# Patient Record
Sex: FEMALE | Race: WHITE | NOT HISPANIC OR LATINO | URBAN - NONMETROPOLITAN AREA
[De-identification: names, ages, dates, MRNs, and addresses within clinical notes are randomized per-mention and may not be internally consistent; named-entity substitution may affect disease eponyms.]

---

## 2017-03-02 ENCOUNTER — APPOINTMENT (RX ONLY)
Dept: URBAN - NONMETROPOLITAN AREA CLINIC 22 | Facility: CLINIC | Age: 47
Setting detail: DERMATOLOGY
End: 2017-03-02

## 2017-03-02 VITALS — WEIGHT: 138 LBS | HEIGHT: 67.5 IN

## 2017-03-02 DIAGNOSIS — L70.0 ACNE VULGARIS: ICD-10-CM

## 2017-03-02 DIAGNOSIS — L71.0 PERIORAL DERMATITIS: ICD-10-CM

## 2017-03-02 PROCEDURE — ? PRESCRIPTION

## 2017-03-02 PROCEDURE — ? MEDICATION COUNSELING

## 2017-03-02 PROCEDURE — ? COUNSELING

## 2017-03-02 PROCEDURE — 99213 OFFICE O/P EST LOW 20 MIN: CPT

## 2017-03-02 PROCEDURE — ? TREATMENT REGIMEN

## 2017-03-02 PROCEDURE — ? PRODUCT LINE (MOISTURIZERS)

## 2017-03-02 RX ORDER — MINOCYCLINE HYDROCHLORIDE 75 MG/1
CAPSULE ORAL
Qty: 30 | Refills: 2 | Status: ERX | COMMUNITY
Start: 2017-03-02

## 2017-03-02 RX ORDER — METRONIDAZOLE 7.5 MG/ML
LOTION TOPICAL
Qty: 59 | Refills: 2 | Status: ERX | COMMUNITY
Start: 2017-03-02

## 2017-03-02 RX ADMIN — MINOCYCLINE HYDROCHLORIDE: 75 CAPSULE ORAL at 20:00

## 2017-03-02 RX ADMIN — METRONIDAZOLE: 7.5 LOTION TOPICAL at 19:55

## 2017-03-02 ASSESSMENT — LOCATION DETAILED DESCRIPTION DERM
LOCATION DETAILED: RIGHT CENTRAL BUCCAL CHEEK
LOCATION DETAILED: LEFT SUPERIOR CENTRAL BUCCAL CHEEK
LOCATION DETAILED: LEFT CENTRAL MALAR CHEEK
LOCATION DETAILED: RIGHT INFERIOR CENTRAL MALAR CHEEK
LOCATION DETAILED: LEFT CHIN

## 2017-03-02 ASSESSMENT — LOCATION SIMPLE DESCRIPTION DERM
LOCATION SIMPLE: LEFT CHEEK
LOCATION SIMPLE: CHIN
LOCATION SIMPLE: RIGHT CHEEK

## 2017-03-02 ASSESSMENT — LOCATION ZONE DERM: LOCATION ZONE: FACE

## 2017-03-02 NOTE — PROCEDURE: PRODUCT LINE (MOISTURIZERS)
Product 35 Price (In Dollars - Numeric Only, No Special Characters Or $): 0.00
Product 45 Units: 0
Detail Level: Zone
Product 1 Units: 2
Name Of Product 1: Cerave AM
Render Product Pricing In Note: Yes
Product 1 Price (In Dollars - Numeric Only, No Special Characters Or $): 10.60

## 2017-03-02 NOTE — HPI: PIMPLES (ACNE)
How Severe Is Your Acne?: mild
Is This A New Presentation, Or A Follow-Up?: Follow Up Acne
Females Only: When Was Your Last Menstrual Period?: 2014

## 2017-03-02 NOTE — PROCEDURE: TREATMENT REGIMEN
Plan: Start regimen geared toward perioral derm/rosacea first, then will reassess acneiform lesions
Detail Level: Zone
Otc Regimen: Cetaphil GSC, Cera Ve A.M. moisturizer -- encouraged use of very gentle products right now
Initiate Treatment: MCN 75mg QD, MetroLotion BID to face

## 2019-06-07 ENCOUNTER — APPOINTMENT (RX ONLY)
Dept: URBAN - NONMETROPOLITAN AREA CLINIC 24 | Facility: CLINIC | Age: 49
Setting detail: DERMATOLOGY
End: 2019-06-07

## 2019-06-07 DIAGNOSIS — L71.0 PERIORAL DERMATITIS: ICD-10-CM

## 2019-06-07 PROBLEM — L55.1 SUNBURN OF SECOND DEGREE: Status: ACTIVE | Noted: 2019-06-07

## 2019-06-07 PROBLEM — L70.0 ACNE VULGARIS: Status: ACTIVE | Noted: 2019-06-07

## 2019-06-07 PROCEDURE — 99213 OFFICE O/P EST LOW 20 MIN: CPT

## 2019-06-07 PROCEDURE — ? PRESCRIPTION

## 2019-06-07 PROCEDURE — ? COUNSELING

## 2019-06-07 RX ORDER — MINOCYCLINE HYDROCHLORIDE 100 MG/1
CAPSULE ORAL
Qty: 60 | Refills: 3 | Status: ERX | COMMUNITY
Start: 2019-06-07

## 2019-06-07 RX ADMIN — MINOCYCLINE HYDROCHLORIDE: 100 CAPSULE ORAL at 18:34

## 2019-06-07 ASSESSMENT — LOCATION ZONE DERM
LOCATION ZONE: LIP
LOCATION ZONE: FACE

## 2019-06-07 ASSESSMENT — LOCATION DETAILED DESCRIPTION DERM
LOCATION DETAILED: RIGHT LOWER CUTANEOUS LIP
LOCATION DETAILED: LEFT INFERIOR MEDIAL MALAR CHEEK
LOCATION DETAILED: LEFT SUPERIOR MEDIAL BUCCAL CHEEK

## 2019-06-07 ASSESSMENT — LOCATION SIMPLE DESCRIPTION DERM
LOCATION SIMPLE: RIGHT LIP
LOCATION SIMPLE: LEFT CHEEK

## 2019-06-07 NOTE — HPI: BODY LOCATION - FACE
How Severe Is Your Condition?: moderate
Additional History: Trial of minocycline; doxycycline; spironolactone.

## 2019-10-23 ENCOUNTER — RX ONLY (OUTPATIENT)
Age: 49
Setting detail: RX ONLY
End: 2019-10-23

## 2019-10-23 RX ORDER — METRONIDAZOLE 10 MG/G
GEL TOPICAL
Qty: 1 | Refills: 6 | Status: ERX | COMMUNITY
Start: 2019-10-23